# Patient Record
Sex: FEMALE | Race: WHITE | NOT HISPANIC OR LATINO
[De-identification: names, ages, dates, MRNs, and addresses within clinical notes are randomized per-mention and may not be internally consistent; named-entity substitution may affect disease eponyms.]

---

## 2023-05-22 ENCOUNTER — APPOINTMENT (OUTPATIENT)
Dept: ORTHOPEDIC SURGERY | Facility: CLINIC | Age: 88
End: 2023-05-22
Payer: MEDICARE

## 2023-05-22 ENCOUNTER — RESULT REVIEW (OUTPATIENT)
Age: 88
End: 2023-05-22

## 2023-05-22 ENCOUNTER — OUTPATIENT (OUTPATIENT)
Dept: OUTPATIENT SERVICES | Facility: HOSPITAL | Age: 88
LOS: 1 days | End: 2023-05-22
Payer: MEDICARE

## 2023-05-22 VITALS
SYSTOLIC BLOOD PRESSURE: 150 MMHG | HEART RATE: 66 BPM | TEMPERATURE: 98.1 F | HEIGHT: 62 IN | BODY MASS INDEX: 23.92 KG/M2 | WEIGHT: 130 LBS | DIASTOLIC BLOOD PRESSURE: 79 MMHG | OXYGEN SATURATION: 95 %

## 2023-05-22 DIAGNOSIS — M17.11 UNILATERAL PRIMARY OSTEOARTHRITIS, RIGHT KNEE: ICD-10-CM

## 2023-05-22 DIAGNOSIS — Z95.0 PRESENCE OF CARDIAC PACEMAKER: ICD-10-CM

## 2023-05-22 DIAGNOSIS — M17.12 UNILATERAL PRIMARY OSTEOARTHRITIS, LEFT KNEE: ICD-10-CM

## 2023-05-22 DIAGNOSIS — Z72.3 LACK OF PHYSICAL EXERCISE: ICD-10-CM

## 2023-05-22 DIAGNOSIS — Z86.39 PERSONAL HISTORY OF OTHER ENDOCRINE, NUTRITIONAL AND METABOLIC DISEASE: ICD-10-CM

## 2023-05-22 DIAGNOSIS — Z86.79 PERSONAL HISTORY OF OTHER DISEASES OF THE CIRCULATORY SYSTEM: ICD-10-CM

## 2023-05-22 DIAGNOSIS — Z78.9 OTHER SPECIFIED HEALTH STATUS: ICD-10-CM

## 2023-05-22 PROBLEM — Z00.00 ENCOUNTER FOR PREVENTIVE HEALTH EXAMINATION: Status: ACTIVE | Noted: 2023-05-22

## 2023-05-22 PROCEDURE — 20610 DRAIN/INJ JOINT/BURSA W/O US: CPT | Mod: RT

## 2023-05-22 PROCEDURE — 73564 X-RAY EXAM KNEE 4 OR MORE: CPT

## 2023-05-22 PROCEDURE — 73564 X-RAY EXAM KNEE 4 OR MORE: CPT | Mod: 26,50

## 2023-05-22 PROCEDURE — 99204 OFFICE O/P NEW MOD 45 MIN: CPT | Mod: 25

## 2023-05-22 RX ORDER — AMLODIPINE BESYLATE 5 MG/1
TABLET ORAL
Refills: 0 | Status: ACTIVE | COMMUNITY

## 2023-05-22 RX ORDER — CELECOXIB 200 MG/1
200 CAPSULE ORAL
Refills: 0 | Status: ACTIVE | COMMUNITY

## 2023-05-24 PROBLEM — M17.12 PRIMARY OSTEOARTHRITIS OF LEFT KNEE: Status: ACTIVE | Noted: 2023-05-24

## 2023-05-24 PROBLEM — M17.11 PRIMARY OSTEOARTHRITIS OF RIGHT KNEE: Status: ACTIVE | Noted: 2023-05-24

## 2023-05-24 NOTE — DISCUSSION/SUMMARY
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 SHe received a cortisone injection today. She will increase her activities as tolerated. we will see her back on an as needed basis.we would consider HA injections in the future. all questions were answered She will call if any issues arise

## 2023-05-24 NOTE — PROCEDURE
[de-identified] : Under strict sterile technique, the left  knee was prepped with Betadine. Using the superolateral approach, with the patient supine, 1ml of Kenalog was mixed with 5mls of 1% Lidocaine and 5mLs of 0.5% Marcaine, and was injected intraarticularly. The patient tolerated the procedure well. The patient was instructed to avoid vigorous exercise for 24 hours and will apply ice to the knee for 20 minutes 2-3 times per day if discomfort occurs. Patient will return on an as needed basis. The patient will call if any questions or problems should arise

## 2023-05-24 NOTE — HISTORY OF PRESENT ILLNESS
[de-identified] : Marci Teixeira is a 89 yo woman who presents with ongoing left knee issues for the last several months. She reports progressive knee pain and stiffness. SHe has had difficulty walking and limited ROM. She has intermittent swelling. She denies any locking or bucking

## 2023-05-24 NOTE — END OF VISIT
[FreeTextEntry3] : All medical record entries made by INOCENCIA Turcios, acting as a scribe for this encounter under the direction of Gary Mehta MD . I have reviewed the chart and agree that the record accurately reflects my personal performance of the history, physical exam, assessment and plan. I have also personally directed, reviewed, and agreed with the chart.

## 2023-05-24 NOTE — PHYSICAL EXAM
[de-identified] : The patient is a well developed, well nourished female in no apparent distress. She is alert and oriented X 3 with a pleasant mood and appropriate affect.\par \par On physical examination of the left knee, her ROM is 0-120 degrees The patient walks with a normal gait and stands in neutral alignment. There is trace effusion. No warmth or erythema is noted. The patella is non tender to palpation medially or laterally. There is no crepitus noted. The apprehension and grind tests are negative. The extensor mechanism is intact. There is medial and lateral joint line tenderness. The Joie sign is negative. The Lachman and pivot shift tests are negative. There is no varus or valgus laxity at 0 or 30 degrees. No posterolateral or anteromedial laxity is noted. No masses are palpable. No other soft tissue or bony tenderness is noted. Quadriceps weakness is noted. Neurovascular function is intact. [de-identified] : Radiographs of both knees show advanced tricompartment DJD

## 2024-03-18 ENCOUNTER — RX ONLY (RX ONLY)
Age: 89
End: 2024-03-18

## 2024-03-18 ENCOUNTER — OFFICE (OUTPATIENT)
Dept: URBAN - METROPOLITAN AREA CLINIC 77 | Facility: CLINIC | Age: 89
Setting detail: OPHTHALMOLOGY
End: 2024-03-18
Payer: MEDICARE

## 2024-03-18 DIAGNOSIS — H50.011: ICD-10-CM

## 2024-03-18 DIAGNOSIS — H27.131: ICD-10-CM

## 2024-03-18 DIAGNOSIS — H35.3111: ICD-10-CM

## 2024-03-18 DIAGNOSIS — H44.23: ICD-10-CM

## 2024-03-18 DIAGNOSIS — H35.3124: ICD-10-CM

## 2024-03-18 DIAGNOSIS — H27.132: ICD-10-CM

## 2024-03-18 DIAGNOSIS — H53.023: ICD-10-CM

## 2024-03-18 DIAGNOSIS — H27.03: ICD-10-CM

## 2024-03-18 DIAGNOSIS — H43.03: ICD-10-CM

## 2024-03-18 DIAGNOSIS — H27.02: ICD-10-CM

## 2024-03-18 DIAGNOSIS — H35.342: ICD-10-CM

## 2024-03-18 PROBLEM — H15.833 STAPHYLOMA POSTICUM; BOTH EYES: Status: ACTIVE | Noted: 2024-03-18

## 2024-03-18 PROCEDURE — 92060 SENSORIMOTOR EXAMINATION: CPT | Performed by: OPHTHALMOLOGY

## 2024-03-18 PROCEDURE — 92285 EXTERNAL OCULAR PHOTOGRAPHY: CPT | Performed by: OPHTHALMOLOGY

## 2024-03-18 PROCEDURE — 92136 OPHTHALMIC BIOMETRY: CPT | Mod: TC | Performed by: OPHTHALMOLOGY

## 2024-03-18 PROCEDURE — 92250 FUNDUS PHOTOGRAPHY W/I&R: CPT | Performed by: OPHTHALMOLOGY

## 2024-03-18 PROCEDURE — 99204 OFFICE O/P NEW MOD 45 MIN: CPT | Performed by: OPHTHALMOLOGY

## 2024-03-18 PROCEDURE — 92286 ANT SGM IMG I&R SPECLR MIC: CPT | Performed by: OPHTHALMOLOGY

## 2024-03-18 PROCEDURE — 92136 OPHTHALMIC BIOMETRY: CPT | Mod: 26,LT | Performed by: OPHTHALMOLOGY

## 2024-03-19 ASSESSMENT — REFRACTION_CURRENTRX
OD_OVR_VA: 20/
OS_SPHERE: -3.00
OD_CYLINDER: -2.50
OS_ADD: +3.00
OS_CYLINDER: -0.25
OD_ADD: +3.00
OD_SPHERE: -3.50
OD_AXIS: 085
OS_AXIS: 125
OS_OVR_VA: 20/

## 2024-03-19 ASSESSMENT — SPHEQUIV_DERIVED
OD_SPHEQUIV: 2.5
OS_SPHEQUIV: 1.375

## 2024-03-19 ASSESSMENT — REFRACTION_MANIFEST
OS_CYLINDER: -1.25
OD_CYLINDER: -2.00
OS_AXIS: 85
OD_ADD: +3.00
OS_VA1: 20/150
OS_ADD: +3.00
OS_SPHERE: +2.00
OD_AXIS: 80
OD_VA1: 20/60
OD_SPHERE: +3.50